# Patient Record
Sex: FEMALE | Race: WHITE
[De-identification: names, ages, dates, MRNs, and addresses within clinical notes are randomized per-mention and may not be internally consistent; named-entity substitution may affect disease eponyms.]

---

## 2020-07-12 ENCOUNTER — HOSPITAL ENCOUNTER (EMERGENCY)
Dept: HOSPITAL 62 - ER | Age: 40
Discharge: HOME | End: 2020-07-12
Payer: COMMERCIAL

## 2020-07-12 VITALS — SYSTOLIC BLOOD PRESSURE: 133 MMHG | DIASTOLIC BLOOD PRESSURE: 85 MMHG

## 2020-07-12 DIAGNOSIS — L03.317: Primary | ICD-10-CM

## 2020-07-12 DIAGNOSIS — F17.200: ICD-10-CM

## 2020-07-12 DIAGNOSIS — J45.909: ICD-10-CM

## 2020-07-12 LAB
ADD MANUAL DIFF: NO
ALBUMIN SERPL-MCNC: 4.5 G/DL (ref 3.5–5)
ALP SERPL-CCNC: 97 U/L (ref 38–126)
ANION GAP SERPL CALC-SCNC: 9 MMOL/L (ref 5–19)
AST SERPL-CCNC: 28 U/L (ref 14–36)
BASOPHILS # BLD AUTO: 0.1 10^3/UL (ref 0–0.2)
BASOPHILS NFR BLD AUTO: 0.6 % (ref 0–2)
BILIRUB DIRECT SERPL-MCNC: 0 MG/DL (ref 0–0.4)
BILIRUB SERPL-MCNC: 0.8 MG/DL (ref 0.2–1.3)
BUN SERPL-MCNC: 9 MG/DL (ref 7–20)
CALCIUM: 9.6 MG/DL (ref 8.4–10.2)
CHLORIDE SERPL-SCNC: 102 MMOL/L (ref 98–107)
CO2 SERPL-SCNC: 25 MMOL/L (ref 22–30)
EOSINOPHIL # BLD AUTO: 0.1 10^3/UL (ref 0–0.6)
EOSINOPHIL NFR BLD AUTO: 0.7 % (ref 0–6)
ERYTHROCYTE [DISTWIDTH] IN BLOOD BY AUTOMATED COUNT: 13.8 % (ref 11.5–14)
GLUCOSE SERPL-MCNC: 116 MG/DL (ref 75–110)
HCT VFR BLD CALC: 42 % (ref 36–47)
HGB BLD-MCNC: 14.3 G/DL (ref 12–15.5)
LYMPHOCYTES # BLD AUTO: 1.5 10^3/UL (ref 0.5–4.7)
LYMPHOCYTES NFR BLD AUTO: 8.8 % (ref 13–45)
MCH RBC QN AUTO: 30.6 PG (ref 27–33.4)
MCHC RBC AUTO-ENTMCNC: 34 G/DL (ref 32–36)
MCV RBC AUTO: 90 FL (ref 80–97)
MONOCYTES # BLD AUTO: 1.2 10^3/UL (ref 0.1–1.4)
MONOCYTES NFR BLD AUTO: 7.1 % (ref 3–13)
NEUTROPHILS # BLD AUTO: 14.3 10^3/UL (ref 1.7–8.2)
NEUTS SEG NFR BLD AUTO: 82.8 % (ref 42–78)
PLATELET # BLD: 313 10^3/UL (ref 150–450)
POTASSIUM SERPL-SCNC: 4.4 MMOL/L (ref 3.6–5)
PROT SERPL-MCNC: 8.1 G/DL (ref 6.3–8.2)
RBC # BLD AUTO: 4.66 10^6/UL (ref 3.72–5.28)
TOTAL CELLS COUNTED % (AUTO): 100 %
WBC # BLD AUTO: 17.2 10^3/UL (ref 4–10.5)

## 2020-07-12 PROCEDURE — 99283 EMERGENCY DEPT VISIT LOW MDM: CPT

## 2020-07-12 PROCEDURE — 80053 COMPREHEN METABOLIC PANEL: CPT

## 2020-07-12 PROCEDURE — 85025 COMPLETE CBC W/AUTO DIFF WBC: CPT

## 2020-07-12 PROCEDURE — 36415 COLL VENOUS BLD VENIPUNCTURE: CPT

## 2020-07-12 PROCEDURE — S0119 ONDANSETRON 4 MG: HCPCS

## 2020-07-12 PROCEDURE — 87040 BLOOD CULTURE FOR BACTERIA: CPT

## 2020-07-12 PROCEDURE — 96372 THER/PROPH/DIAG INJ SC/IM: CPT

## 2020-07-12 NOTE — ER DOCUMENT REPORT
ED Medical Screen (RME)





- General


Chief Complaint: Abscess


Stated Complaint: ABSCESS/BUTTOCKS


Time Seen by Provider: 20 15:20


Mode of Arrival: Ambulatory


Notes: 





This is a 40-year-old female presents emergency room today stating that she has 

an abscess to the right medial aspect of her buttock she has had it for about 4 

to 5 days is very red very inflamed she is unable to sit very painful when she 

stands she has had nausea vomiting and a low-grade fever.


TRAVEL OUTSIDE OF THE [place] IN LAST 30 DAYS: No





- Related Data


Allergies/Adverse Reactions: 


                                        





No Known Allergies Allergy (Verified 20 15:16)


   











Past Medical History





- Social History


Frequency of alcohol use: None


Drug Abuse: None


Pulmonary Medical History: Reports: Hx Asthma - as a child


Renal/ Medical History: Denies: Hx Peritoneal Dialysis


Past Surgical History: Reports: Hx  Section, Hx Tubal Ligation





- Immunizations


Hx Diphtheria, Pertussis, Tetanus Vaccination: No





Physical Exam





- Vital signs


Vitals: 





                                        











Temp Pulse Resp BP Pulse Ox


 


 99.8 F   108 H  18   134/87 H  98 


 


 20 15:02  20 15:02  20 15:02  20 15:02  20 15:02














Course





- Vital Signs


Vital signs: 





                                        











Temp Pulse Resp BP Pulse Ox


 


 99.8 F   108 H  18   134/87 H  98 


 


 20 15:16  20 15:02  20 15:02  20 15:02  20 15:02

## 2020-07-12 NOTE — ER DOCUMENT REPORT
ED General





- General


Chief Complaint: Abscess


Stated Complaint: ABSCESS/BUTTOCKS


Time Seen by Provider: 20 15:20


Mode of Arrival: Ambulatory


Notes: 





Patient is a 40-year-old white female with no significant past medical history 

presents the emergency department chief complaint of red swollen tender area to 

the right buttock that began about 4 days ago.  She states it started as a small

pimple-like area and has gradually worsened.  Yesterday she tried to "squeeze 

and poke it".  She states after that the pain worsened.  She states there is an 

area of redness around it that has been expanding.  She denies any drainage from

the wound except a little bit of blood after poking and squeezing.  She denies 

prodding it with any instrumentation or device.  Denies any fever, nausea, 

vomiting, diarrhea, chills or night sweats.  States that she has had these in 

the past with a normally resolve on their own without any intervention or 

normally much smaller.


TRAVEL OUTSIDE OF THE [place] IN LAST 30 DAYS: No





- Related Data


Allergies/Adverse Reactions: 


                                        





No Known Allergies Allergy (Verified 20 15:16)


   











Past Medical History





- Social History


Smoking Status: Current Every Day Smoker


Frequency of alcohol use: None


Drug Abuse: None


Family History: Reviewed & Not Pertinent


Patient has homicidal ideation: No


Pulmonary Medical History: Reports: Hx Asthma - as a child


Renal/ Medical History: Denies: Hx Peritoneal Dialysis


Past Surgical History: Reports: Hx  Section, Hx Tubal Ligation





- Immunizations


Hx Diphtheria, Pertussis, Tetanus Vaccination: No





Review of Systems





- Review of Systems


Constitutional: denies: Fever


EENT: denies: Throat pain


Cardiovascular: denies: Chest pain


Respiratory: denies: Short of breath


Gastrointestinal: denies: Abdominal pain


Genitourinary: denies: Dysuria


Female Genitourinary: denies: Irregular period


Musculoskeletal: denies: Back pain


Skin: Change in color


Hematologic/Lymphatic: denies: Easy bleeding


Neurological/Psychological: denies: Headaches





Physical Exam





- Vital signs


Vitals: 





                                        











Temp Pulse Resp BP Pulse Ox


 


 99.8 F   108 H  18   134/87 H  98 


 


 20 15:02  20 15:02  20 15:02  20 15:02  20 15:02














- General


General appearance: Appears well, Alert


In distress: None





- Respiratory


Respiratory status: No respiratory distress


Chest status: Nontender


Breath sounds: Normal


Chest palpation: Normal





- Cardiovascular


Rhythm: Regular


Heart sounds: Normal auscultation





- Neurological


Neuro grossly intact: Yes


Cognition: Normal


Orientation: AAOx4





- Psychological


Associated symptoms: Normal affect, Normal mood





- Skin


Skin Color: Other - Cellulitis approximately 7 cm in diameter to the right 

buttock posteriorly, centrally located.  Not involving the buttock cleft or not 

in the region of or involving any kaylyn-anal or perirectal regions.  Overlying 

area of induration approximately 3-1/2 cm in diameter.  There is no fluctuance 

appreciated.  No drainage.  There is no proximal streaking.  Area is tender to 

palpation.  Chaperoned by female nurse.





Course





- Re-evaluation


Re-evalutation: 





20 16:15


Bedside ultrasound utilized showing cobblestoning of the area, consistent with 

cellulitis without evidence of pocket of drainable fluid.  No evidence of 

abscess formation.  Patient given a shot of Rocephin and oral Bactrim here.  

Given Norco for pain and Zofran prophylactically for nausea.  She will be sent 

home on Bactrim, [place] and Zofran as needed.  Counseled her regarding sitz 

baths.  Discussed the importance of outpatient follow-up and 2 to 3 days for 

wound recheck and reevaluation.  Advised that she return here or any ER 

immediately with any new, persistent or worsening symptoms.  She verbalized 

understood and agreed.





- Vital Signs


Vital signs: 





                                        











Temp Pulse Resp BP Pulse Ox


 


 99.8 F   108 H  18   134/87 H  98 


 


 20 15:16  20 15:02  20 15:02  20 15:02  20 15:02














- Laboratory


Result Diagrams: 


                                 20 15:31





                                 20 15:31


Laboratory results interpreted by me: 





                                        











  20





  15:31 15:31


 


WBC  17.2 H 


 


Lymph % (Auto)  8.8 L 


 


Absolute Neuts (auto)  14.3 H 


 


Seg Neutrophils %  82.8 H 


 


Sodium   135.6 L


 


Glucose   116 H














Discharge





- Discharge


Clinical Impression: 


Cellulitis


Qualifiers:


 Site of cellulitis: buttock Qualified Code(s): L03.317 - Cellulitis of buttock





Condition: Stable


Disposition: HOME, SELF-CARE


Instructions:  MRSA Cellulitis ([facility]), Trimethoprim-Sulfa ([facility])


Additional Instructions: 


Follow-up with your regular doctor in 2 to 3 days for reevaluation.  Return here

or any ER immediately with any new, persistent or worsening symptoms.


Prescriptions: 


Sulfamethoxazole/Trimethoprim [Bactrim Ds Tablet] 1 each PO BID #19 tablet


Hydrocodone/Acetaminophen [Norco  Tablet] 1 each PO Q6 PRN #12 tablet


 PRN Reason: 


Ondansetron [Zofran Odt 4 mg Tablet] 4 mg PO Q8 PRN #20 tab.rapdis


 PRN Reason:

## 2020-07-14 ENCOUNTER — HOSPITAL ENCOUNTER (EMERGENCY)
Dept: HOSPITAL 62 - ER | Age: 40
Discharge: LEFT BEFORE DISCHARGE | End: 2020-07-14
Payer: COMMERCIAL

## 2020-07-14 VITALS — SYSTOLIC BLOOD PRESSURE: 147 MMHG | DIASTOLIC BLOOD PRESSURE: 74 MMHG

## 2020-07-14 DIAGNOSIS — Z53.20: ICD-10-CM

## 2020-07-14 DIAGNOSIS — L02.31: Primary | ICD-10-CM

## 2020-07-14 DIAGNOSIS — R50.9: ICD-10-CM

## 2020-07-14 LAB
ADD MANUAL DIFF: NO
ALBUMIN SERPL-MCNC: 4 G/DL (ref 3.5–5)
ALP SERPL-CCNC: 133 U/L (ref 38–126)
ANION GAP SERPL CALC-SCNC: 9 MMOL/L (ref 5–19)
AST SERPL-CCNC: 49 U/L (ref 14–36)
BASOPHILS # BLD AUTO: 0 10^3/UL (ref 0–0.2)
BASOPHILS NFR BLD AUTO: 0.4 % (ref 0–2)
BILIRUB DIRECT SERPL-MCNC: 0.1 MG/DL (ref 0–0.4)
BILIRUB SERPL-MCNC: 0.5 MG/DL (ref 0.2–1.3)
BUN SERPL-MCNC: 14 MG/DL (ref 7–20)
CALCIUM: 9.1 MG/DL (ref 8.4–10.2)
CHLORIDE SERPL-SCNC: 104 MMOL/L (ref 98–107)
CO2 SERPL-SCNC: 24 MMOL/L (ref 22–30)
EOSINOPHIL # BLD AUTO: 0.2 10^3/UL (ref 0–0.6)
EOSINOPHIL NFR BLD AUTO: 1.7 % (ref 0–6)
ERYTHROCYTE [DISTWIDTH] IN BLOOD BY AUTOMATED COUNT: 14 % (ref 11.5–14)
GLUCOSE SERPL-MCNC: 110 MG/DL (ref 75–110)
HCT VFR BLD CALC: 39.4 % (ref 36–47)
HGB BLD-MCNC: 13.3 G/DL (ref 12–15.5)
LYMPHOCYTES # BLD AUTO: 1.7 10^3/UL (ref 0.5–4.7)
LYMPHOCYTES NFR BLD AUTO: 13.6 % (ref 13–45)
MCH RBC QN AUTO: 30.1 PG (ref 27–33.4)
MCHC RBC AUTO-ENTMCNC: 33.8 G/DL (ref 32–36)
MCV RBC AUTO: 89 FL (ref 80–97)
MONOCYTES # BLD AUTO: 0.9 10^3/UL (ref 0.1–1.4)
MONOCYTES NFR BLD AUTO: 7 % (ref 3–13)
NEUTROPHILS # BLD AUTO: 9.6 10^3/UL (ref 1.7–8.2)
NEUTS SEG NFR BLD AUTO: 77.3 % (ref 42–78)
PLATELET # BLD: 277 10^3/UL (ref 150–450)
POTASSIUM SERPL-SCNC: 4.2 MMOL/L (ref 3.6–5)
PROT SERPL-MCNC: 7.5 G/DL (ref 6.3–8.2)
RBC # BLD AUTO: 4.42 10^6/UL (ref 3.72–5.28)
TOTAL CELLS COUNTED % (AUTO): 100 %
WBC # BLD AUTO: 12.5 10^3/UL (ref 4–10.5)

## 2020-07-14 PROCEDURE — 99281 EMR DPT VST MAYX REQ PHY/QHP: CPT

## 2020-07-14 PROCEDURE — 85025 COMPLETE CBC W/AUTO DIFF WBC: CPT

## 2020-07-14 PROCEDURE — 36415 COLL VENOUS BLD VENIPUNCTURE: CPT

## 2020-07-14 PROCEDURE — 80053 COMPREHEN METABOLIC PANEL: CPT

## 2020-07-14 PROCEDURE — 83605 ASSAY OF LACTIC ACID: CPT

## 2020-07-14 NOTE — ER DOCUMENT REPORT
ED Medical Screen (RME)





- General


Stated Complaint: ABSCESS RECHECK


TRAVEL OUTSIDE OF THE [place] IN LAST 30 DAYS: No





- HPI


Notes: 





20 15:55


40-year-old female presents emergency room for evaluation of an abscess to her 

right buttocks.  Patient was seen in the emergency room 2 days ago for the same 

complaint, there was no fluid collection seen by that provider and the area of 

erythema was marked with a marker, she was given a gram of Rocephin and started 

on oral Bactrim.  Today the area of erythema has extended approximately 4 to 5 

cm outside of the marked area, patient states she is has worsening pain and is 

unable to sit on her bottom.  Patient reports she is having some drainage from 

her abscess. reports she had a fever of 100.4 F and feels like she has chills.  

Denies any chest pain shortness of breath, nausea vomiting or diarrhea.  Reports

pain is 4 out of 5





I have greeted and performed a rapid initial assessment of this patient.  A 

comprehensive ED assessment and evaluation of the patient, analysis of test 

results and completion of the medical decision making process will be conducted 

by additional ED providers.





PHYSICAL EXAMINATION:





GENERAL: Well-appearing, well-nourished and in mild distress


CV: s1, s2 regular 





LUNGS: No respiratory distress





Musculoskeletal: Normal range of motion





NEUROLOGICAL:  Normal speech, normal gait. 





SKIN: Warm, Dry, normal turgor, no rashes or lesions noted.  Next with 

approximately 9 cm x 9 cm area of erythema, which extends probably 4 cm annular 

around the surgical marker palpitations.  Noted purulent drainage from center of

wound.  No streaking noted





- Related Data


Allergies/Adverse Reactions: 


                                        





No Known Allergies Allergy (Verified 20 15:16)


   











Past Medical History


Pulmonary Medical History: Reports: Hx Asthma - as a child


Renal/ Medical History: Denies: Hx Peritoneal Dialysis


Past Surgical History: Reports: Hx  Section, Hx Tubal Ligation





- Immunizations


Hx Diphtheria, Pertussis, Tetanus Vaccination: No





Physical Exam





- Vital signs


Vitals: 





                                        











Temp Pulse Resp BP Pulse Ox


 


 99.0 F   88   20   150/107 H  99 


 


 20 15:29  20 15:29  20 15:29  20 15:29  20 15:29














Course





- Vital Signs


Vital signs: 





                                        











Temp Pulse Resp BP Pulse Ox


 


 99.0 F   94   18   147/74 H  97 


 


 20 15:35  20 15:35  20 15:35  20 15:35  20 15:35

## 2020-07-15 ENCOUNTER — HOSPITAL ENCOUNTER (EMERGENCY)
Dept: HOSPITAL 62 - ER | Age: 40
Discharge: HOME | End: 2020-07-15
Payer: COMMERCIAL

## 2020-07-15 VITALS — SYSTOLIC BLOOD PRESSURE: 149 MMHG | DIASTOLIC BLOOD PRESSURE: 92 MMHG

## 2020-07-15 DIAGNOSIS — L02.31: Primary | ICD-10-CM

## 2020-07-15 DIAGNOSIS — F17.200: ICD-10-CM

## 2020-07-15 PROCEDURE — 99282 EMERGENCY DEPT VISIT SF MDM: CPT

## 2020-07-15 NOTE — ER DOCUMENT REPORT
ED General





- General


Chief Complaint: Abscess


Stated Complaint: ABSCESS ON BUTTOCKS


Time Seen by Provider: 07/15/20 10:22


Mode of Arrival: Ambulatory


Information source: Patient


TRAVEL OUTSIDE OF THE [place] IN LAST 30 DAYS: No





- HPI


Notes: 





Patient complains of right buttock pain.  She states he has had this pain for 

approximately 5 to 6 days.  It is severe and constant.  Is worse with touch and 

better if left alone.  It does radiate up into her right lower back.  She was 

seen here several days ago who prescribed antibiotics however she states that 

the pain swelling is getting worse and that it started to drain.  She states she

has never had any other types of abscesses.  No fevers.





- Related Data


Allergies/Adverse Reactions: 


                                        





No Known Allergies Allergy (Verified 07/15/20 09:05)


   











Past Medical History





- General


Information source: Patient





- Social History


Smoking Status: Current Every Day Smoker


Chew tobacco use (# tins/day): No


Frequency of alcohol use: None


Drug Abuse: None


Family History: Reviewed & Not Pertinent


Patient has homicidal ideation: No


Pulmonary Medical History: Reports: Hx Asthma - as a child


Renal/ Medical History: Denies: Hx Peritoneal Dialysis


Past Surgical History: Reports: Hx  Section, Hx Tubal Ligation





- Immunizations


Hx Diphtheria, Pertussis, Tetanus Vaccination: No





Review of Systems





- Review of Systems


Constitutional: denies: Chills, Fever


Cardiovascular: denies: Chest pain, Palpitations


Respiratory: denies: Cough, Short of breath


-: Yes All other systems reviewed and negative





Physical Exam





- Vital signs


Vitals: 





                                        











Temp Pulse Resp BP Pulse Ox


 


 98.4 F   97   18   149/92 H  97 


 


 07/15/20 08:22  07/15/20 08:22  07/15/20 08:22  07/15/20 08:22  07/15/20 08:22











Interpretation: Hypertensive





- General


General appearance: Appears well, Alert





- HEENT


Head: Normocephalic, Atraumatic


Eyes: Normal


Pupils: PERRL





- Respiratory


Respiratory status: No respiratory distress


Chest status: Nontender


Breath sounds: Normal


Chest palpation: Normal





- Cardiovascular


Rhythm: Regular


Heart sounds: Normal auscultation


Murmur: No





- Abdominal


Inspection: Normal


Distension: No distension


Bowel sounds: Normal


Tenderness: Nontender


Organomegaly: No organomegaly





- Back


Back: Normal, Nontender





- Extremities


General upper extremity: Normal inspection, Nontender, Normal color, Normal ROM,

Normal temperature


General lower extremity: Normal inspection, Nontender, Normal color, Normal ROM,

Normal temperature, Normal weight bearing.  No: Bryanna's sign





- Neurological


Neuro grossly intact: Yes


Cognition: Normal


Orientation: AAOx4


[place] Coma Scale Eye Opening: Spontaneous


[place] Coma Scale Verbal: Oriented


[place] Coma Scale Motor: Obeys Commands


[place] Coma Scale Total: 15


Speech: Normal


Motor strength normal: LUE, RUE, LLE, RLE


Sensory: Normal





- Psychological


Associated symptoms: Normal affect, Normal mood





- Skin


Skin Temperature: Warm


Skin Moisture: Dry


Skin Color: Other - Skin exam was unremarkable other than patient's right 

buttock.  Right buttock has a large area of erythema and induration with some 

fluctuance at the center.  It is tender to palpation.  Is consistent with an 

abscess.  It is currently draining some white-yellow pus.





Course





- Vital Signs


Vital signs: 





                                        











Temp Pulse Resp BP Pulse Ox


 


 98.4 F   97   18   149/92 H  97 


 


 07/15/20 08:22  07/15/20 08:22  07/15/20 08:22  07/15/20 08:22  07/15/20 08:22














Procedures





- Incision and Drainage


  ** Right Buttock


Time completed: 11:09


Type: Simple


Anesthetic type: 1% Lidocaine


mL's of anesthetic: 5


Blade size: 11


I&D procedure: Iodoform packing placed, Sterile dressing applied


Incision Method: Incision made by scalpel


Amount/type of drainage: 10cc's pus/blood





Discharge





- Discharge


Clinical Impression: 


 Abscess of buttock, right





Condition: Stable


Disposition: HOME, SELF-CARE


Instructions:  Abscess ([facility]), Post Incision and Drainage


Additional Instructions: 


Please return in 2 days for reevaluation


Prescriptions: 


Amoxicillin/Potassium Clav [Augmentin 875-125 Tablet] 1 tab PO BID 5 Days #10 

tablet


Forms:  Return to Work

## 2020-07-17 ENCOUNTER — HOSPITAL ENCOUNTER (EMERGENCY)
Dept: HOSPITAL 62 - ER | Age: 40
Discharge: HOME | End: 2020-07-17
Payer: COMMERCIAL

## 2020-07-17 VITALS — SYSTOLIC BLOOD PRESSURE: 128 MMHG | DIASTOLIC BLOOD PRESSURE: 72 MMHG

## 2020-07-17 DIAGNOSIS — L02.31: Primary | ICD-10-CM

## 2020-07-17 DIAGNOSIS — J45.909: ICD-10-CM

## 2020-07-17 DIAGNOSIS — F17.210: ICD-10-CM

## 2020-07-17 PROCEDURE — 87077 CULTURE AEROBIC IDENTIFY: CPT

## 2020-07-17 PROCEDURE — 87205 SMEAR GRAM STAIN: CPT

## 2020-07-17 PROCEDURE — 87070 CULTURE OTHR SPECIMN AEROBIC: CPT

## 2020-07-17 PROCEDURE — 87075 CULTR BACTERIA EXCEPT BLOOD: CPT

## 2020-07-17 PROCEDURE — 99282 EMERGENCY DEPT VISIT SF MDM: CPT

## 2020-07-17 PROCEDURE — 87186 SC STD MICRODIL/AGAR DIL: CPT

## 2020-07-17 NOTE — ER DOCUMENT REPORT
Entered by AICHA VNETURA SCRIBE  20 0931 





Acting as scribe for:LUIS SOLANO MD





ED Wound





- General


Chief Complaint: Wound Recheck


Stated Complaint: WOUND RECHECK


Time Seen by Provider: 20 09:05


Information source: Patient


Notes: 





This 40 year old female patient presents to the emergency department today for a

re-check of a right buttock abscess. Patient mentions that when she changed her 

dressed this morning she thinks she might have pulled out all of the packing as 

well. 


TRAVEL OUTSIDE OF THE [place] IN LAST 30 DAYS: No





- Related Data


Allergies/Adverse Reactions: 


                                        





No Known Allergies Allergy (Verified 07/15/20 09:05)


   











Past Medical History





- General


Information source: Patient





- Social History


Smoking Status: Current Every Day Smoker


Cigarette use (# per day): Yes


Frequency of alcohol use: None


Drug Abuse: None


Lives with: Family


Family History: Reviewed & Not Pertinent


Pulmonary Medical History: Reports: Hx Asthma - as a child


Past Surgical History: Reports: Hx  Section, Hx Tubal Ligation





- Immunizations


Hx Diphtheria, Pertussis, Tetanus Vaccination: No





Review of Systems





- Review of Systems


Constitutional: No symptoms reported


EENT: No symptoms reported


Cardiovascular: No symptoms reported


Respiratory: No symptoms reported


Gastrointestinal: No symptoms reported


Genitourinary: No symptoms reported


Female Genitourinary: No symptoms reported


Musculoskeletal: No symptoms reported


Skin: See HPI, Lesions


Hematologic/Lymphatic: No symptoms reported


Neurological/Psychological: No symptoms reported


-: Yes All other systems reviewed and negative





Physical Exam





- Vital signs


Vitals: 


                                        











Temp Pulse Resp BP Pulse Ox


 


 97.8 F   75   20   148/76 H  100 


 


 20 07:59  20 07:59  20 07:59  20 07:59  20 07:59














- Notes


Notes: 





Physical Exam:


 


General: Alert, appears well. 


 


HEENT: Normocephalic. Atraumatic. PERRLA. Extraocular movements intact. 

Oropharynx clear.


 


Neck: Supple. 


 


Respiratory: No respiratory distress. 


 


Abdominal: Normal Inspection. No distension. 


 


Extremities: Moves all four extremities.


 


Neurological: Normal cognition. AAOx4. Normal speech.  


 


Psychological: Normal affect. Normal Mood. 


 


Skin: Right buttock has a large area of induration with a central small circular

opening. This abscess was probed to furthest depth, and was re-probed multiple 

times with a q-tip dipped in peroxide. There was moderate purulent discharge 

expressed. Patient's boyfriend at bedside was also educated on this technique.





Course





- Vital Signs


Vital signs: 


                                        











Temp Pulse Resp BP Pulse Ox


 


 98.5 F   80   20   128/72 H  99 


 


 20 08:05  20 08:05  20 08:05  20 08:05  20 08:05














Discharge





- Discharge


Clinical Impression: 


 Abscess of buttock, right





Disposition: HOME, SELF-CARE


Additional Instructions: 


Keep the abscess wound open using a Q-tip dipped in peroxide to probe the 

cavity.  


Do this several times daily over the next few days to allow the wound to heal 

from the inside out.





RETURN TO THE EMERGENCY ROOM IF ANY NEW OR WORSENING SYMPTOMS.














I personally performed the services described in the documentation, reviewed and

edited the documentation which was dictated to the scribe in my presence, and it

accurately records my words and actions.